# Patient Record
Sex: MALE | Race: WHITE | HISPANIC OR LATINO | URBAN - METROPOLITAN AREA
[De-identification: names, ages, dates, MRNs, and addresses within clinical notes are randomized per-mention and may not be internally consistent; named-entity substitution may affect disease eponyms.]

---

## 2019-08-25 ENCOUNTER — HOSPITAL ENCOUNTER (EMERGENCY)
Facility: HOSPITAL | Age: 44
Discharge: HOME | End: 2019-08-26
Attending: FAMILY MEDICINE

## 2019-08-25 DIAGNOSIS — H57.11 EYE PAIN, RIGHT: ICD-10-CM

## 2019-08-25 DIAGNOSIS — S05.01XA ABRASION OF RIGHT CORNEA, INITIAL ENCOUNTER: Primary | ICD-10-CM

## 2019-08-25 PROCEDURE — 99284 EMERGENCY DEPT VISIT MOD MDM: CPT | Mod: 25

## 2019-08-25 PROCEDURE — 25000003 PHARM REV CODE 250: Performed by: NURSE PRACTITIONER

## 2019-08-25 PROCEDURE — 90715 TDAP VACCINE 7 YRS/> IM: CPT | Performed by: NURSE PRACTITIONER

## 2019-08-25 PROCEDURE — 90471 IMMUNIZATION ADMIN: CPT | Performed by: NURSE PRACTITIONER

## 2019-08-25 PROCEDURE — 63600175 PHARM REV CODE 636 W HCPCS: Performed by: NURSE PRACTITIONER

## 2019-08-25 RX ORDER — ERYTHROMYCIN 5 MG/G
OINTMENT OPHTHALMIC
Qty: 1 TUBE | Refills: 0 | Status: SHIPPED | OUTPATIENT
Start: 2019-08-25

## 2019-08-25 RX ORDER — DICLOFENAC SODIUM 50 MG/1
50 TABLET, DELAYED RELEASE ORAL 3 TIMES DAILY PRN
Qty: 15 TABLET | Refills: 0 | Status: SHIPPED | OUTPATIENT
Start: 2019-08-25

## 2019-08-25 RX ORDER — ERYTHROMYCIN 5 MG/G
OINTMENT OPHTHALMIC
Status: COMPLETED | OUTPATIENT
Start: 2019-08-25 | End: 2019-08-25

## 2019-08-25 RX ORDER — PROPARACAINE HYDROCHLORIDE 5 MG/ML
1 SOLUTION/ DROPS OPHTHALMIC
Status: COMPLETED | OUTPATIENT
Start: 2019-08-25 | End: 2019-08-25

## 2019-08-25 RX ADMIN — PROPARACAINE HYDROCHLORIDE 1 DROP: 5 SOLUTION/ DROPS OPHTHALMIC at 11:08

## 2019-08-25 RX ADMIN — CLOSTRIDIUM TETANI TOXOID ANTIGEN (FORMALDEHYDE INACTIVATED), CORYNEBACTERIUM DIPHTHERIAE TOXOID ANTIGEN (FORMALDEHYDE INACTIVATED), BORDETELLA PERTUSSIS TOXOID ANTIGEN (GLUTARALDEHYDE INACTIVATED), BORDETELLA PERTUSSIS FILAMENTOUS HEMAGGLUTININ ANTIGEN (FORMALDEHYDE INACTIVATED), BORDETELLA PERTUSSIS PERTACTIN ANTIGEN, AND BORDETELLA PERTUSSIS FIMBRIAE 2/3 ANTIGEN 0.5 ML: 5; 2; 2.5; 5; 3; 5 INJECTION, SUSPENSION INTRAMUSCULAR at 11:08

## 2019-08-25 RX ADMIN — FLUORESCEIN SODIUM 1 EACH: 1 STRIP OPHTHALMIC at 11:08

## 2019-08-25 RX ADMIN — ERYTHROMYCIN 1 INCH: 5 OINTMENT OPHTHALMIC at 11:08

## 2019-08-26 VITALS
BODY MASS INDEX: 32.34 KG/M2 | SYSTOLIC BLOOD PRESSURE: 128 MMHG | DIASTOLIC BLOOD PRESSURE: 80 MMHG | WEIGHT: 201.25 LBS | RESPIRATION RATE: 18 BRPM | OXYGEN SATURATION: 99 % | TEMPERATURE: 97 F | HEART RATE: 82 BPM | HEIGHT: 66 IN

## 2019-08-26 NOTE — ED PROVIDER NOTES
SCRIBE #1 NOTE: I, Tera Montoya, am scribing for, and in the presence of, Roman Tello NP. I have scribed the entire note.      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 Eye red, tearing. States feels scratchy and painful.       Review of patient's allergies indicates:  No Known Allergies     HPI   HPI    8/25/2019, 10:49 PM   History obtained from the patient      History of Present Illness: Pérez Leal is a 44 y.o. male patient who presents to the Emergency Department for a foreign body in R eye, onset just PTA.  Pt states he got wood stuck in his eye from carpentry work today. Symptoms are constant and moderate in severity. No mitigating or exacerbating factors reported. Associated sxs include R eye redness, pain, and tearing. Patient denies any fever, chills, weakness, numbness, nausea, and all other sxs at this time. No prior Tx reported. No further complaints or concerns at this time.         Arrival mode: Personal vehicle     PCP: Primary Doctor No       Past Medical History:  History reviewed. No pertinent medical history.    Past Surgical History:  History reviewed. No pertinent surgical history.    Family History:  History reviewed. No pertinent family history.    Social History:  Social History Main Topics    Smoking status: Unknown if ever smoked    Smokeless tobacco: Unknown if ever used    Alcohol Use: Unknown drinking history    Drug Use: Unknown if ever used    Sexual Activity: Unknown       ROS   Review of Systems   Constitutional: Negative for chills and fever.   HENT: Negative for sore throat.    Eyes: Positive for pain (R eye), discharge (R eye tearing) and redness (R eye).   Respiratory: Negative for shortness of breath.    Cardiovascular: Negative for chest pain.   Gastrointestinal: Negative for nausea.   Genitourinary: Negative for dysuria.   Musculoskeletal: Negative for back pain.   Skin: Negative  "for rash.   Neurological: Negative for weakness and numbness.   Hematological: Does not bruise/bleed easily.   All other systems reviewed and are negative.      Physical Exam      Initial Vitals [08/25/19 2246]   BP Pulse Resp Temp SpO2   136/82 85 16 97.5 °F (36.4 °C) 98 %      MAP       --          Physical Exam  Nursing Notes and Vital Signs Reviewed.  Constitutional: Patient is in mild distress. Well-developed and well-nourished.  Head: Atraumatic. Normocephalic.  Eyes: PERRL. EOM intact. R eye conjunctiva is erythemas. Clear tears noted. No scleral icterus.    Woods lamp reveals a right medial abrasion.    Eye swept and flushed, no foreign body.     ENT: Mucous membranes are moist. Oropharynx is clear and symmetric.    Neck: Supple. Full ROM. No lymphadenopathy.  Cardiovascular: Regular rate. Regular rhythm. No murmurs, rubs, or gallops. Distal pulses are 2+ and symmetric.  Pulmonary/Chest: No respiratory distress. Clear to auscultation bilaterally. No wheezing or rales.  Abdominal: Soft and non-distended.  There is no tenderness.  No rebound, guarding, or rigidity. Good bowel sounds.  Genitourinary: No CVA tenderness  Musculoskeletal: Moves all extremities. No obvious deformities. No edema. No calf tenderness.  Skin: Warm and dry.  Neurological:  Alert, awake, and appropriate.  Normal speech.  No acute focal neurological deficits are appreciated.  Psychiatric: Normal affect. Good eye contact. Appropriate in content.    ED Course    Procedures  ED Vital Signs:  Vitals:    08/25/19 2246 08/26/19 0001   BP: 136/82 128/80   Pulse: 85 82   Resp: 16 18   Temp: 97.5 °F (36.4 °C) 97.2 °F (36.2 °C)   TempSrc: Oral Oral   SpO2: 98% 99%   Weight: 91.3 kg (201 lb 4.5 oz)    Height: 5' 6" (1.676 m)        Abnormal Lab Results:  Labs Reviewed - No data to display     All Lab Results:      Imaging Results:  Imaging Results    None                 The Emergency Provider reviewed the vital signs and test results, which are " outlined above.    ED Discussion   I discussed with patient and/or family/caretaker that evaluation in the ED does not suggest any emergent or life threatening medical conditions requiring immediate intervention beyond what was provided in the ED, and I believe patient is safe for discharge. Regardless, an unremarkable evaluation in the ED does not preclude the development or presence of a serious of life threatening condition. As such, patient was instructed to return immediately for any worsening or change in current symptoms.    Regarding CORNEAL ABRASION, I instructed patient to apply a cold pack (ice in a plastic bag, wrapped in a towel) may be applied over the eye (or eyepatch) for 20 minutes at a time, to reduce pain; use acetaminophen (Tylenol) or ibuprofen (Motrin, Advil) to control pain, unless another pain medicine was prescribed; rest eyes and do not read until symptoms are gone; do not wear them until all symptoms are gone; if vision is affected by the corneal abrasion or if an eyepatch was applied, DO NOT DRIVE a motor vehicle or operate machinery until all symptoms are gone due to the risks associated with judging distances with only one eye; and if eyes are sensitive to light, try wearing sunglasses, or stay indoors, until symptoms go away.  Follow up with optometrist or ophthalmologists as advised by our staff. If pain continues for more than 48 hours, you should have another exam. Return to this facility or contact the referral doctor to arrange this. Follow up with primary care provider or eye specialist, or return to emergency department if any of the following occur: Increasing eye pain or pain that does not improve after 24 hours; discharge from the eye; increasing redness of the eye or swelling of the eyelids; or vision worsens.        ED Medication(s):  Medications   proparacaine 0.5 % ophthalmic solution 1 drop (1 drop Both Eyes Given by Other 8/25/19 5674)   fluorescein ophthalmic strip 1  each (1 each Both Eyes Given by Other 8/25/19 5751)   Tdap vaccine injection 0.5 mL (0.5 mLs Intramuscular Given 8/25/19 2300)   erythromycin 5 mg/gram (0.5 %) ophthalmic ointment (1 inch Right Eye Given by Other 8/25/19 1694)       Follow-up Information     [facility]    Specialty:  Emergency Medicine  Why:  As needed, If symptoms worsen  Contact information:  [address]  [facility] [address] [phone]           Schedule an appointment as soon as possible for a visit  with Elijah Yañez MD.    Specialties:  Ophthalmology, Surgery  Contact information:  [address]  [place] [address] [phone]                   Discharge Medication List as of 8/25/2019 11:48 PM      START taking these medications    Details   diclofenac (VOLTAREN) 50 MG EC tablet Take 1 tablet (50 mg total) by mouth 3 (three) times daily as needed., Starting Sun 8/25/2019, Print      erythromycin (ROMYCIN) ophthalmic ointment Place a 1/2 inch ribbon of ointment into the lower eyelid. 4 times a day, Print             Medical Decision Making              Scribe Attestation:   Scribe #1: I performed the above scribed service and the documentation accurately describes the services I performed. I attest to the accuracy of the note.    Attending:   Physician Attestation Statement for Scribe #1: I, Roman Tello NP, personally performed the services described in this documentation, as scribed by Tera Montoya, in my presence, and it is both accurate and complete.          Clinical Impression       ICD-10-CM ICD-9-CM   1. Abrasion of right cornea, initial encounter S05.01XA 918.1   2. Eye pain, right H57.11 379.91       Disposition:   Disposition: Discharged  Condition: Stable         Rmoan Tello NP  08/26/19 4450